# Patient Record
Sex: MALE | Race: WHITE | Employment: OTHER | URBAN - METROPOLITAN AREA
[De-identification: names, ages, dates, MRNs, and addresses within clinical notes are randomized per-mention and may not be internally consistent; named-entity substitution may affect disease eponyms.]

---

## 2020-08-13 PROBLEM — R97.20 ELEVATED PSA: Status: ACTIVE | Noted: 2020-08-13

## 2020-08-13 PROBLEM — G89.29 CHRONIC BILATERAL LOW BACK PAIN WITHOUT SCIATICA: Status: ACTIVE | Noted: 2020-08-13

## 2020-08-13 PROBLEM — M54.50 CHRONIC BILATERAL LOW BACK PAIN WITHOUT SCIATICA: Status: ACTIVE | Noted: 2020-08-13

## 2020-08-25 ENCOUNTER — HOSPITAL ENCOUNTER (OUTPATIENT)
Dept: CT IMAGING | Facility: CLINIC | Age: 75
Discharge: HOME | End: 2020-08-27
Payer: MEDICARE

## 2020-08-25 PROCEDURE — 72131 CT LUMBAR SPINE W/O DYE: CPT

## 2022-05-19 PROBLEM — I50.22 CHRONIC SYSTOLIC (CONGESTIVE) HEART FAILURE (HCC): Status: ACTIVE | Noted: 2022-05-19

## 2023-12-20 ENCOUNTER — APPOINTMENT (OUTPATIENT)
Age: 78
End: 2023-12-20
Payer: MEDICARE

## 2023-12-26 ENCOUNTER — HOSPITAL ENCOUNTER (OUTPATIENT)
Age: 78
Setting detail: THERAPIES SERIES
Discharge: HOME | End: 2023-12-26
Payer: MEDICARE

## 2023-12-26 PROCEDURE — 97110 THERAPEUTIC EXERCISES: CPT

## 2023-12-26 NOTE — FLOWSHEET NOTE
[x] [facility]  [address]  [place]: [phone]  F: [phone]    Physical Therapy Daily Treatment Note      Date:  2023  Patient Name:  Narayan Ardon    :  1945  MRN: [id number]  Patient: Narayan Ardon                      : 1945                      MRN: [id number]  Physician: Molly Jorge MD                                    Insurance: [address] REQUIRED  Medical Diagnosis: R knee pain                   Rehab Codes: M25.561, M25.661, R26.2  Onset date: 12/15/23             Next Dr's appt.: 23     Visit# / total visits: 3/20  Cancels/No Shows: 0/0    Subjective:  23   Pain:  [x] Yes  [] No Location: R knee Pain Rating: (0-10 scale) 7/10  Pain altered Tx:  [] No  [x] Yes  Action: minimized flexion activities today due to bleeding    Comments: pt states his R knee is still \"seeping\" , he spoke w/ his surgeon who said he would see him tomorrow at his f/u    Pt states his R knee has been aching more    Objective:  Modalities:   Precautions:  Exercises:  Exercise Reps/ Time Weight/ Level Comments   Std glut sets 20 x 5\"       Quad sets 10 x 5\"       Heel raises 20 x       Heel slides 10 x       Patellar mobes 10 x all directions        Nu Step S:14 x 5 mins    For gentle ROM    TKE at wall w/ pillow 15 x 5\"        seated knee flex 15 x       LAQ 10 x   End range assist   SLR 5 x  Mod assist                                                       Other:        23  R knee AAROM:  - 5 - 110 deg    Specific Instructions for next treatment: check to ensure pt contacted his surgeon regarding bleeding            Assessment: [] Progressing toward goals. 23  pt still having some seepage in R knee and difficulty chase R quad muscle    [] No change.      [x] Other: mild bleeding that pt was instructed to call his surgeon about    [] Patient would continue to benefit from

## 2023-12-28 ENCOUNTER — HOSPITAL ENCOUNTER (OUTPATIENT)
Age: 78
Setting detail: THERAPIES SERIES
Discharge: HOME | End: 2023-12-28
Payer: MEDICARE

## 2023-12-28 PROCEDURE — 97110 THERAPEUTIC EXERCISES: CPT

## 2024-01-02 ENCOUNTER — HOSPITAL ENCOUNTER (OUTPATIENT)
Age: 79
Setting detail: THERAPIES SERIES
Discharge: HOME | End: 2024-01-02
Payer: MEDICARE

## 2024-01-02 PROCEDURE — 97140 MANUAL THERAPY 1/> REGIONS: CPT

## 2024-01-02 PROCEDURE — 97110 THERAPEUTIC EXERCISES: CPT

## 2024-01-02 NOTE — FLOWSHEET NOTE
understanding.  [] Needs review.  [] Demonstrates/verbalizes HEP/Ed previously given.     Plan: [] Continue per plan of care.   [] Other:      Treatment Charges: Mins Units   []  Modalities     [x]  Ther Exercise 30 2   [x]  Manual Therapy 10 1   []  Ther Activities     []  Aquatics     []  Neuromuscular     [] Vasocompression     [] Gait Training     [] Dry needling        [] 1 or 2 muscles        [] 3 or more muscles     []  Other     Total BILLABLE  time 40 3   Medicare est. Charges as of 01/02/24 :  $61.81  Time In: 5:15         Time Out: 6:05    Electronically signed by:  Luis Felipe Harley PTA

## 2024-01-02 NOTE — FLOWSHEET NOTE
plan of care.   [] Other:      Treatment Charges: Mins Units   []  Modalities     [x]  Ther Exercise 40 3   []  Manual Therapy     []  Ther Activities     []  Aquatics     []  Neuromuscular     [] Vasocompression     [] Gait Training     [] Dry needling        [] 1 or 2 muscles        [] 3 or more muscles     []  Other     Total BILLABLE  time 40 3   Medicare est. Charges as of 01/02/24 :  $404.50  Time In:11:45            Time Out: 12:30    Electronically signed by:  Luis Felipe Harley PTA

## 2024-01-03 ENCOUNTER — HOSPITAL ENCOUNTER (OUTPATIENT)
Age: 79
Setting detail: THERAPIES SERIES
Discharge: HOME | End: 2024-01-03
Payer: MEDICARE

## 2024-01-03 PROCEDURE — 97110 THERAPEUTIC EXERCISES: CPT

## 2024-01-03 NOTE — FLOWSHEET NOTE
[x] [facility]   Outpatient Rehabilitation & Therapy  [address] [place] [address] [facility] [address]  P: [phone]  F: [phone]    Physical Therapy Daily Treatment Note      Date:  1/3/2024  Patient Name:  Jeffry Chow    :  1945  MRN: [id number]  Patient: Jeffry Chow                      : 1945                      MRN: [id number]  Physician: Ildefonso Quiros MD                                    Insurance: Medicare/[facility] -          NO PRIOR AUTH REQUIRED  Medical Diagnosis: R knee pain                   Rehab Codes: M25.561, M25.661, R26.2  Onset date: 12/15/23             Next Dr's appt.: 23     Visit# / total visits:   Cancels/No Shows: 0/0    Subjective:  24   Pain:  [x] Yes  [] No Location: R groin Rating: (0-10 scale)  3-4/10  Pain altered Tx:  [x] No  [] Yes  Action:     Comments: pt c/o R groin pain and post R hip pain this AM and occasional R knee pain      Objective:  Modalities:   Precautions:  Exercises:  Exercise Reps/ Time Weight/ Level Comments   Std glut sets 20 x 5\"       Quad sets 10 x 5\"       Heel raises 20 x       Toe raises reclining against wall 10 x 2\"     Std hip abd R 10 x     Heel slides 10 x       HS curls  20 x      Patellar mobes 10 x all directions        Nu Step S:14 x 5 mins  L 5   For gentle ROM    TKE at wall w/ pillow 15 x 5\"        seated knee flex 15 x       LAQ 10 x 5\"     SLR 5 x  Mod assist - 2 x independently w/  difficulty   SAQ 10 x 5\"             Manual R knee          PROM and MOBS  5'                            Other:  HEP updated as below:    Access Code: [email or web address]  URL: [email or web address]  Date: 2023  Prepared by: Eddie Velarde    Exercises  - Heel Raises with Counter Support  - 2 x daily - 7 x weekly - 1 sets - 20 reps  - Supine Quadricep Sets  - 2 x daily - 7 x weekly - 1 sets - 15 reps - 5 seconds hold  - Seated Knee Flexion Extension AROM   - 2 x daily - 7 x weekly - 1 sets - 10 reps - 2 second

## 2024-01-04 ENCOUNTER — APPOINTMENT (OUTPATIENT)
Age: 79
End: 2024-01-04
Payer: MEDICARE

## 2024-01-05 ENCOUNTER — APPOINTMENT (OUTPATIENT)
Age: 79
End: 2024-01-05
Payer: MEDICARE

## 2024-01-08 ENCOUNTER — HOSPITAL ENCOUNTER (OUTPATIENT)
Age: 79
Setting detail: THERAPIES SERIES
Discharge: HOME | End: 2024-01-08
Payer: MEDICARE

## 2024-01-08 NOTE — FLOWSHEET NOTE
[facility] Outpatient Physical Therapy              [address], [place] [address]              Phone: [phone]              Fax: [phone]    Physical Therapy Cancel/No Show note    Date: 2024  Patient: Jeffry Chow  : 1945  MRN: [id number]      Cancels/No Shows to date:     For today's appointment patient:    [x]  Cancelled    [] Rescheduled appointment    [] No-show     Reason given by patient:    []  Patient ill    [x]  Conflicting appointment    [] No transportation      [] Conflict with work    [] No reason given    [] Weather related    [] COVID-19    [] Other:      Comments:        [x] Next appointment was confirmed    Electronically signed by: Luis Felipe Harley PTA

## 2024-01-10 ENCOUNTER — APPOINTMENT (OUTPATIENT)
Age: 79
End: 2024-01-10
Payer: MEDICARE

## 2024-01-12 ENCOUNTER — APPOINTMENT (OUTPATIENT)
Age: 79
End: 2024-01-12
Payer: MEDICARE

## 2024-01-16 ENCOUNTER — HOSPITAL ENCOUNTER (OUTPATIENT)
Age: 79
Setting detail: THERAPIES SERIES
End: 2024-01-16
Payer: MEDICARE

## 2024-01-19 ENCOUNTER — APPOINTMENT (OUTPATIENT)
Age: 79
End: 2024-01-19
Payer: MEDICARE

## 2024-05-14 ENCOUNTER — HOSPITAL ENCOUNTER (OUTPATIENT)
Age: 79
Setting detail: THERAPIES SERIES
Discharge: HOME | End: 2024-05-14
Payer: MEDICARE

## 2024-05-14 PROCEDURE — 97161 PT EVAL LOW COMPLEX 20 MIN: CPT

## 2024-05-14 NOTE — FLOWSHEET NOTE
[facility] Fall Risk Assessment    Patient Name:  Jeffry Chow  : 1945    Risk Factor Scale  Score   History of Falls [] Yes  [x] No 25  0 0   Secondary Diagnosis [] Yes  [x] No 15  0 0   Ambulatory Aid [] Furniture  [x] Crutches/cane/walker  [] None/bedrest/wheelchair/nurse 30  15  0 15   IV/Heparin Lock [] Yes  [x] No 20  0 0   Gait/Transferring [] Impaired  [] Weak  [x] Normal/bedrest/immobile 20  10  0 0   Mental Status [] Forgets limitations  [x] Oriented to own ability 15  0 0      Total:15     Based on the Assessment score: check the appropriate box.    [x]  No intervention needed   Low =   Score of 0-24    []  Use standard prevention interventions Moderate =  Score of 24-44   [] Give patient handout and discuss fall prevention strategies   [] Establish goal of education for patient/family RE: fall prevention strategies    []  Use high risk prevention interventions High = Score of 45 and higher   [] Give patient handout and discuss fall prevention strategies   [] Establish goal of education for patient/family Re: fall prevention strategies   [] Discuss lifeline / other resources    Electronically signed by:   Eddie Velarde PT  Date: 2024

## 2024-05-14 NOTE — CONSULTS
Referral:  [x] No  [] Yes:  Barriers to Goal Achievement:  [x] No  [] Yes:  Domestic Concerns:  [x] No  [] Yes:    Pt. Education:  [x] Plans/Goals, Risks/Benefits discussed  [x] Home exercise program    Method of Education: [x] Verbal  [x] Demo  [] Written  Comprehension of Education:  [x] Verbalizes understanding.  [x] Demonstrates understanding.  [] Needs Review.  [] Demonstrates/verbalizes understanding of HEP/Ed previously given.    Treatment Plan:  [x] Therapeutic Exercise   28119  [] Iontophoresis: 4 mg/mL Dexamethasone Sodium Phosphate  mAmin  08278   [x] Therapeutic Activity  53056 [] Vasopneumatic cold with compression  29653    [] Gait Training   00589 [] Ultrasound   08833   [x] Neuromuscular Re-education  30231 [] Electrical Stimulation Unattended  68861   [x] Manual Therapy  00888 [] Electrical Stimulation Attended  68282   [x] Instruction in HEP  [] Lumbar/Cervical Traction  34317   [] Aquatic Therapy   90749 [] Cold/hotpack    [] Massage   46541      [] Dry Needling, 1 or 2 muscles  86690   [] Biofeedback, first 15 minutes   63735  [] Biofeedback, additional 15 minutes   86663 [] Dry Needling, 3 or more muscles  31533     []  Medication allergies reviewed for use of    Dexamethasone Sodium Phosphate 4mg/ml     with iontophoresis treatments.   Pt is not allergic.    Frequency:  2 x/week for 20 visits        Today’s Treatment:  Modalities:   Precautions:  Ant approach R TIM 4/3/24  Exercises:  Exercise Reps/ Time Weight/ Level Comments   Clam shell L 10 x                             Other: HEP as above:    Access Code: [email or web address]  URL: [email or web address]  Date: 05/14/2024  Prepared by: Eddie Velarde    Exercises  - Andrea  - 1 x daily - 7 x weekly - 3 sets - 10 reps    Specific Instructions for next treatment: advance LE strengthening exercises as appropriate      Evaluation Complexity:  History (Personal factors, comorbidities) [x] 0 [] 1-2 [] 3+   Exam (limitations, restrictions) [x]

## 2024-05-15 ENCOUNTER — APPOINTMENT (OUTPATIENT)
Age: 79
End: 2024-05-15
Payer: MEDICARE

## 2024-05-17 ENCOUNTER — HOSPITAL ENCOUNTER (OUTPATIENT)
Age: 79
Setting detail: THERAPIES SERIES
Discharge: HOME | End: 2024-05-17
Payer: MEDICARE

## 2024-05-17 PROCEDURE — 97110 THERAPEUTIC EXERCISES: CPT

## 2024-05-17 NOTE — CARE COORDINATION
Dr. Hutchinson,    We are seeing Jeffry Chow in physical therapy following his R total hip upon referral from his orthopedist, Kartik Ortiz MD.    Mr. Chow's main c/o is a decrease in stamina. He notes he has to climb a flight of stairs to his office and he becomes SOB to the point that the last 6 steps are very difficult.    Today in PT he became SOB during exercise. We had him sit and rest, then took his vitals which were:    BP: 172/94  HR: 72  Sa02: 99%      After a few minutes more rest his BP returned to 152/85.     Do to his cardiac history, I wanted to contact you, inform you if his symptoms and check to see if there was anything you would like us to avoid w/ this pt or if you would like him to schedule a follow up w/ you.    Thank you for your help in this matter.    Sincerely,  Eddie Velarde, PT

## 2024-05-17 NOTE — FLOWSHEET NOTE
[x] [facility]   Outpatient Rehabilitation & Therapy  [address]  P: [phone]  F: [phone]    Physical Therapy Daily Treatment Note      Date:  2024  Patient Name:  Jeffry Chow    :  1945  MRN: [id number]  Physician: Kartik Ortiz MD                                 Insurance: Medicare/[employer] [place] [employer] - no prior auth required  Medical Diagnosis: status post total hip replacement                     Rehab Codes: R53.1, R26.2, Z47.1, Z96.641, R53.83  Onset date: 4/3/24                 Next Dr's appt.: TBD  Visit# / total visits:   Cancels/No Shows: 0/0    Subjective:  24   Pain:  [] Yes  [x] No Location: R hip Pain Rating: (0-10 scale) 0/10  Pain altered Tx:  [x] No  [] Yes  Action:    Comments: no c/o R hip pain but he is coming from 7 hrs of work today and states he is fatigued    He has to climb a flight of stairs to get up to his office and notes he gets fatigued w/ this and the last 6 steps are difficult    Objective:  Modalities:   Precautions:  Ant approach R TIM 4/3/24; Pacemaker and AICD  Exercises:  Exercise Reps/ Time Weight/ Level Comments   Clam shell L 20x        Nu step  S:13, L:3 x 5 mins       TM      Step ups 6\" x 15     Lat step ups 4\" x 15     squats       sit-to-stand  22\" x 10        std glut sets 15 x 5\"       Supine heel slides      Side lying R hip abd 10 x AAROM                    Other:      Specific Instructions for next treatment: advance LE strengthening exercises as appropriate      Assessment: [] Progressing toward goals. 24 pt was SOB after NuStep and step ups; he was allowed to sit in a chair x 5 min then his vitals were assess:  BP: 172/94, HR: 72; Sa02: 99%    He was allowed to rest 5 more mins.  Vitals were:  BP: 162/92    He then performed sit-to-stand, clam shell, and AAROM hip abd and vitals after this were:  BP: 152/85; Sa02: 98%; HR: 73    We will send a communication to his cardiologist regarding

## 2024-05-20 ENCOUNTER — HOSPITAL ENCOUNTER (OUTPATIENT)
Age: 79
Setting detail: THERAPIES SERIES
Discharge: HOME | End: 2024-05-20
Payer: MEDICARE

## 2024-05-20 PROCEDURE — 97110 THERAPEUTIC EXERCISES: CPT

## 2024-05-20 NOTE — FLOWSHEET NOTE
[x] [facility]   Outpatient Rehabilitation & Therapy  [address]  P: [phone]  F: [phone]    Physical Therapy Daily Treatment Note      Date:  2024  Patient Name:  Jeffry Chow    :  1945  MRN: [id number]  Physician: Kartik Ortiz MD                                 Insurance: Medicare/[employer] [place] [employer] - no prior auth required  Medical Diagnosis: status post total hip replacement                     Rehab Codes: R53.1, R26.2, Z47.1, Z96.641, R53.83  Onset date: 4/3/24                 Next Dr's appt.: TBD  Visit# / total visits: 3/20  Cancels/No Shows: 0/0    Subjective:  24   Pain:  [] Yes  [x] No Location: R hip Pain Rating: (0-10 scale) 0/10  Pain altered Tx:  [x] No  [] Yes  Action:    Comments: pt states he worked in his yard 6 hrs each day  and  and then he climb stadium stairs at a graduation at [facility] yesterday; he denied pain but his stamina was low and he just \"took it slow\"    Objective:    Resting vitals before treatment: HR: 72, BP: 152/82, Sa02: 98%    Resting vitals after treatment:  HR: 70, BP: 152/82, Sa02: 98%          Modalities:   Precautions:  Ant approach R TIM 4/3/24; Pacemaker and AICD  Exercises:  Exercise Reps/ Time Weight/ Level Comments   Clam shell L 20x        Nu step  S:13, L:3 x 5 mins       TM      Step ups 6\" x 10 each     Step up and over 10 x ea 4\" step     Lat step ups      squats       sit-to-stand  22\" x 10        std glut sets        Supine heel slides      Side lying R hip abd 10 x AROM CGA                   Other:      Specific Instructions for next treatment: advance LE strengthening exercises as appropriate      Assessment: [x] Progressing toward goals. 24 rest breaks taken in between exercises allowed pt to complete his program today w/o an elevation of his vitals and pt was more active outside the clinic over the weekend  w/o c/o    [] No change.     [] Other:    [x] Patient would continue to

## 2024-05-24 ENCOUNTER — HOSPITAL ENCOUNTER (OUTPATIENT)
Age: 79
Setting detail: THERAPIES SERIES
Discharge: HOME | End: 2024-05-24
Payer: MEDICARE

## 2024-05-24 PROCEDURE — 97110 THERAPEUTIC EXERCISES: CPT

## 2024-05-24 NOTE — FLOWSHEET NOTE
[x] [facility]   Outpatient Rehabilitation & Therapy  [address]  P: [phone]  F: [phone]    Physical Therapy Daily Treatment Note      Date:  2024  Patient Name:  Jeffry Chow    :  1945  MRN: [id number]  Physician: Kartik Ortiz MD                                 Insurance: Medicare/[employer] [place] [employer] - no prior auth required  Medical Diagnosis: status post total hip replacement                     Rehab Codes: R53.1, R26.2, Z47.1, Z96.641, R53.83  Onset date: 4/3/24                 Next Dr's appt.: TBD  Visit# / total visits:   Cancels/No Shows: 0/0    Subjective:  24   Pain:  [] Yes  [x] No Location: R hip Pain Rating: (0-10 scale) 0/10  Pain altered Tx:  [x] No  [] Yes  Action:    Comments: pt states he is feeling good. Only feeling sore today. No pain.     Objective:    Resting vitals before treatment: HR: 72, BP: 174/98,L arm -  154/94 R arm Sa02: 96%    Resting vitals after treatment:  HR: 70, BP: 166/92, Sa02: 96%          Modalities:   Precautions:  Ant approach R TIM 4/3/24; Pacemaker and AICD  Exercises:  Exercise Reps/ Time Weight/ Level Comments   Clam shell L 20x        Nu step  S:13, L:3 x 5 mins       TM      Step ups 4\" x 10 each     Step up and over 10 x ea 4\" step     Squats down to hip chair  10 x      Seated HS curls  20x denise BLUE    Seated marching 12 x ea  BLUE     TKE  20x denise  Purple     Standing hip abd/ext  15 x ea      Lat step ups      squats       sit-to-stand          std glut sets        Supine heel slides      Standing hip abd   10 x      Side lying R hip abd  10 x AROM CGA                   Other:      Specific Instructions for next treatment: advance LE strengthening exercises as appropriate      Assessment: [x] Progressing toward goals. 24 rest breaks taken in between exercises allowed pt to complete his program today still d/t vitals being high.  Pt continues to report feeling more fatigue. Pt plans to

## 2024-06-04 ENCOUNTER — HOSPITAL ENCOUNTER (OUTPATIENT)
Age: 79
Setting detail: THERAPIES SERIES
Discharge: HOME | End: 2024-06-04
Payer: MEDICARE

## 2024-06-04 PROCEDURE — 97110 THERAPEUTIC EXERCISES: CPT

## 2024-06-04 NOTE — FLOWSHEET NOTE
[x] [facility]   Outpatient Rehabilitation & Therapy  [address]  P: [phone]  F: [phone]    Physical Therapy Daily Treatment Note      Date:  2024  Patient Name:  Jeffry Chow    :  1945  MRN: [id number]  Physician: Kartik Ortiz MD                                 Insurance: Medicare/[employer] [place] [employer] - no prior auth required  Medical Diagnosis: status post total hip replacement                     Rehab Codes: R53.1, R26.2, Z47.1, Z96.641, R53.83  Onset date: 4/3/24                 Next Dr's appt.: TBD  Visit# / total visits:   Cancels/No Shows: 0/0    Subjective:  24   Pain:  [] Yes  [x] No Location: R hip Pain Rating: (0-10 scale) 0/10  Pain altered Tx:  [x] No  [] Yes  Action:    Comments: pt states he is still feeling tired. States he continues to get tired easily and his stairs fatigue him and are \"tough.\" Reports continued frustration with his lack of ability to get up and go, not d/t his hip. States he feels like his Testosterone is very low. Reports numbness down his R lateral LE that he says has been normal.       Objective:    Resting vitals before treatment: HR: 72 /86    Sa02:97      Resting vitals after treatment:  HR: 70, BP: 156/80 , Sa02: 97%          Modalities:   Precautions:  Ant approach R TIM 4/3/24; Pacemaker and AICD  Exercises:  Exercise Reps/ Time Weight/ Level Comments   Clam shell L 20x        Nu step  S:13, L:3 x 5 mins    Fatigue noted ~3:45 (vitals taken)    TM      Step ups 4\" x 10 each     Lateral step up  4\" x 10 each      Step up and over 10 x ea 4\" step     Squats down to hip chair  10 x      Seated HS curls  20x denise BLUE    Seated marching 12 x ea  BLUE     TKE  20x denise  Purple     Standing hip abd/ext  15 x ea      Lat step ups      squats       sit-to-stand          std glut sets        Supine heel slides      Standing hip abd   10 x      Side lying R hip abd  10 x AROM CGA                   Other:

## 2025-01-03 ENCOUNTER — OFFICE VISIT (OUTPATIENT)
Dept: PRIMARY CARE CLINIC | Age: 80
End: 2025-01-03

## 2025-01-03 VITALS
TEMPERATURE: 98.3 F | WEIGHT: 209 LBS | HEART RATE: 80 BPM | OXYGEN SATURATION: 98 % | DIASTOLIC BLOOD PRESSURE: 66 MMHG | SYSTOLIC BLOOD PRESSURE: 112 MMHG | BODY MASS INDEX: 27.58 KG/M2

## 2025-01-03 DIAGNOSIS — Z20.818 STREPTOCOCCUS EXPOSURE: Primary | ICD-10-CM

## 2025-01-03 DIAGNOSIS — R05.1 ACUTE COUGH: ICD-10-CM

## 2025-01-03 RX ORDER — AMOXICILLIN 500 MG/1
500 CAPSULE ORAL 2 TIMES DAILY
Qty: 20 CAPSULE | Refills: 0 | Status: SHIPPED | OUTPATIENT
Start: 2025-01-03 | End: 2025-01-13

## 2025-01-03 RX ORDER — FUROSEMIDE 40 MG/1
20 TABLET ORAL DAILY
COMMUNITY
Start: 2024-02-21

## 2025-01-03 RX ORDER — MAGNESIUM OXIDE 400 MG/1
400 TABLET ORAL DAILY
COMMUNITY
Start: 2024-03-18

## 2025-01-03 RX ORDER — PREDNISONE 20 MG/1
20 TABLET ORAL 2 TIMES DAILY
Qty: 10 TABLET | Refills: 0 | Status: SHIPPED | OUTPATIENT
Start: 2025-01-03 | End: 2025-01-08

## 2025-01-03 ASSESSMENT — ENCOUNTER SYMPTOMS
GASTROINTESTINAL NEGATIVE: 1
SORE THROAT: 1
CHEST TIGHTNESS: 0
SHORTNESS OF BREATH: 1
EYES NEGATIVE: 1
COUGH: 1
RHINORRHEA: 1

## 2025-01-03 NOTE — PROGRESS NOTES
(ELIQUIS PO) Take 5 mg by mouth in the morning and at bedtime      losartan (COZAAR) 100 MG tablet Take 1 tablet by mouth daily  7     No current facility-administered medications for this visit.         ALLERGIES     Finasteride and Codeine    FAMILY HISTORY     No family history on file.  Family Status   Relation Name Status    Mother      Father     No partnership data on file          SOCIAL HISTORY      reports that he has never smoked. He has never used smokeless tobacco. He reports that he does not drink alcohol and does not use drugs.      PHYSICAL EXAM    (up to 7 for level 4, 8 or more for level 5)     Vitals:    25 1225   BP: 112/66   Site: Left Upper Arm   Position: Sitting   Cuff Size: Medium Adult   Pulse: 80   Temp: 98.3 °F (36.8 °C)   TempSrc: Tympanic   SpO2: 98%   Weight: 94.8 kg (209 lb)         Physical Exam  Vitals and nursing note reviewed.   Constitutional:       General: He is not in acute distress.     Appearance: Normal appearance. He is not ill-appearing.   HENT:      Head: Normocephalic and atraumatic.      Right Ear: External ear normal.      Left Ear: External ear normal.      Nose: Nose normal.      Mouth/Throat:      Mouth: Mucous membranes are moist.   Eyes:      Extraocular Movements: Extraocular movements intact.      Conjunctiva/sclera: Conjunctivae normal.      Pupils: Pupils are equal, round, and reactive to light.   Pulmonary:      Effort: Pulmonary effort is normal.   Abdominal:      Palpations: Abdomen is soft.   Neurological:      Mental Status: He is alert.           DIFFERENTIAL DIAGNOSIS:       Raúl reviewed the disposition diagnosis with the patient and or their family/guardian.  I have answered their questions and given discharge instructions.  They voiced understanding of these instructions and did not have anyfurther questions or complaints.      PROCEDURES:  No orders of the defined types were placed in this encounter.      No results found for